# Patient Record
Sex: FEMALE | Race: WHITE | NOT HISPANIC OR LATINO | Employment: FULL TIME | URBAN - NONMETROPOLITAN AREA
[De-identification: names, ages, dates, MRNs, and addresses within clinical notes are randomized per-mention and may not be internally consistent; named-entity substitution may affect disease eponyms.]

---

## 2018-08-01 ENCOUNTER — OFFICE VISIT (OUTPATIENT)
Dept: SURGERY | Facility: CLINIC | Age: 28
End: 2018-08-01

## 2018-08-01 VITALS
SYSTOLIC BLOOD PRESSURE: 120 MMHG | WEIGHT: 158 LBS | BODY MASS INDEX: 31.02 KG/M2 | DIASTOLIC BLOOD PRESSURE: 80 MMHG | RESPIRATION RATE: 16 BRPM | HEART RATE: 60 BPM | HEIGHT: 60 IN

## 2018-08-01 DIAGNOSIS — R14.0 BLOATING: Primary | ICD-10-CM

## 2018-08-01 DIAGNOSIS — K59.09 OTHER CONSTIPATION: ICD-10-CM

## 2018-08-01 DIAGNOSIS — K21.9 GASTROESOPHAGEAL REFLUX DISEASE, ESOPHAGITIS PRESENCE NOT SPECIFIED: ICD-10-CM

## 2018-08-01 DIAGNOSIS — R14.1 ABDOMINAL GAS PAIN: ICD-10-CM

## 2018-08-01 PROCEDURE — 99204 OFFICE O/P NEW MOD 45 MIN: CPT | Performed by: SURGERY

## 2018-08-01 RX ORDER — MELATONIN
1000 DAILY
COMMUNITY

## 2018-08-01 RX ORDER — BISACODYL 5 MG/1
5 TABLET, DELAYED RELEASE ORAL DAILY PRN
COMMUNITY

## 2018-08-01 RX ORDER — IBUPROFEN 800 MG
1 TABLET ORAL DAILY
COMMUNITY

## 2018-08-01 RX ORDER — IBUPROFEN 200 MG
200 TABLET ORAL EVERY 6 HOURS PRN
COMMUNITY
End: 2018-08-28 | Stop reason: HOSPADM

## 2018-08-01 NOTE — H&P
"Ammy Mendosa 28 y.o. female presents @ the req of Dr. Hernandez for eval of constipation \" most of my life.\" Pt reports abd pain, bloating, constipation, and father =colon polyps.       HPI   Above noted and agree.  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  She has had issues with constipation her entire life.  She moves her bowels once a week.  She also has abdominal bloating and gas pain.  She has anxiety associated with this.  She has tried fiber, water, exercise, and many other medications without success.  She has never had an EGD or colonoscopy.  She denies chest pain and fevers or chills.  She has no other complaints.      Review of Systems   All other systems reviewed and are negative.          Past Medical History:   Diagnosis Date   • Anxiety    • GERD (gastroesophageal reflux disease)    • Seasonal affective disorder (CMS/HCC)            Past Surgical History:   Procedure Laterality Date   • WISDOM TOOTH EXTRACTION             Physical Exam   Constitutional: She is oriented to person, place, and time. She appears well-developed and well-nourished.   HENT:   Head: Normocephalic and atraumatic.   Neck: Neck supple.   Cardiovascular: Normal rate and regular rhythm.    Pulmonary/Chest: Effort normal and breath sounds normal.   Abdominal: Soft. Bowel sounds are normal.   Musculoskeletal: She exhibits no edema or deformity.   Neurological: She is alert and oriented to person, place, and time.   Skin: Skin is warm and dry.   Psychiatric: She has a normal mood and affect. Her behavior is normal.   Nursing note and vitals reviewed.          /80   Pulse 60   Resp 16   Ht 152.4 cm (60\")   Wt 71.7 kg (158 lb)   BMI 30.86 kg/m²          Ammy was seen today for constipation.    Diagnoses and all orders for this visit:    Bloating    Abdominal gas pain    Other constipation    Gastroesophageal reflux disease, esophagitis presence not specified    Other orders  -     " linaclotide (LINZESS) 290 MCG capsule capsule; Take 1 capsule by mouth Every Morning Before Breakfast.    We will schedule Ammy for an EGD, colonoscopy, and gallbladder ultrasound.    I discussed with the patient the benefits and risks of performing endoscopy.  Benefits and risks were not limited to but including bleeding, infection, perforation, complications of anesthesia, aspiration.  The patient appeared to understand and is willing to proceed.    Thank you for allowing me to participate in the care of this interesting patient.

## 2018-08-01 NOTE — PROGRESS NOTES
"Ammy Mendosa 28 y.o. female presents @ the req of Dr. Hernandez for eval of constipation \" most of my life.\" Pt reports abd pain, bloating, constipation, and father =colon polyps.       HPI   Above noted and agree.  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  She has had issues with constipation her entire life.  She moves her bowels once a week.  She also has abdominal bloating and gas pain.  She has anxiety associated with this.  She has tried fiber, water, exercise, and many other medications without success.  She has never had an EGD or colonoscopy.  She denies chest pain and fevers or chills.  She has no other complaints.      Review of Systems   All other systems reviewed and are negative.          Past Medical History:   Diagnosis Date   • Anxiety    • GERD (gastroesophageal reflux disease)    • Seasonal affective disorder (CMS/HCC)            Past Surgical History:   Procedure Laterality Date   • WISDOM TOOTH EXTRACTION             Physical Exam   Constitutional: She is oriented to person, place, and time. She appears well-developed and well-nourished.   HENT:   Head: Normocephalic and atraumatic.   Neck: Neck supple.   Cardiovascular: Normal rate and regular rhythm.    Pulmonary/Chest: Effort normal and breath sounds normal.   Abdominal: Soft. Bowel sounds are normal.   Musculoskeletal: She exhibits no edema or deformity.   Neurological: She is alert and oriented to person, place, and time.   Skin: Skin is warm and dry.   Psychiatric: She has a normal mood and affect. Her behavior is normal.   Nursing note and vitals reviewed.          /80   Pulse 60   Resp 16   Ht 152.4 cm (60\")   Wt 71.7 kg (158 lb)   BMI 30.86 kg/m²         Ammy was seen today for constipation.    Diagnoses and all orders for this visit:    Bloating    Abdominal gas pain    Other constipation    Gastroesophageal reflux disease, esophagitis presence not specified    Other orders  -     linaclotide " (LINZESS) 290 MCG capsule capsule; Take 1 capsule by mouth Every Morning Before Breakfast.    We will schedule Ammy for an EGD, colonoscopy, and gallbladder ultrasound.    I discussed with the patient the benefits and risks of performing endoscopy.  Benefits and risks were not limited to but including bleeding, infection, perforation, complications of anesthesia, aspiration.  The patient appeared to understand and is willing to proceed.    Thank you for allowing me to participate in the care of this interesting patient.

## 2018-08-02 DIAGNOSIS — R14.0 BLOATING: Primary | ICD-10-CM

## 2018-08-27 ENCOUNTER — ANESTHESIA EVENT (OUTPATIENT)
Dept: PERIOP | Facility: HOSPITAL | Age: 28
End: 2018-08-27

## 2018-08-28 ENCOUNTER — ANESTHESIA (OUTPATIENT)
Dept: PERIOP | Facility: HOSPITAL | Age: 28
End: 2018-08-28

## 2018-08-28 ENCOUNTER — HOSPITAL ENCOUNTER (OUTPATIENT)
Dept: ULTRASOUND IMAGING | Facility: HOSPITAL | Age: 28
Discharge: HOME | End: 2018-08-28
Attending: SURGERY

## 2018-08-28 ENCOUNTER — HOSPITAL ENCOUNTER (OUTPATIENT)
Facility: HOSPITAL | Age: 28
Setting detail: HOSPITAL OUTPATIENT SURGERY
Discharge: HOME | End: 2018-08-28
Attending: SURGERY | Admitting: SURGERY

## 2018-08-28 VITALS
WEIGHT: 153 LBS | SYSTOLIC BLOOD PRESSURE: 128 MMHG | HEIGHT: 60 IN | DIASTOLIC BLOOD PRESSURE: 74 MMHG | BODY MASS INDEX: 30.04 KG/M2 | RESPIRATION RATE: 15 BRPM | TEMPERATURE: 98.6 F | HEART RATE: 70 BPM | OXYGEN SATURATION: 96 %

## 2018-08-28 DIAGNOSIS — R14.0 BLOATING: ICD-10-CM

## 2018-08-28 DIAGNOSIS — R14.1 ABDOMINAL GAS PAIN: ICD-10-CM

## 2018-08-28 DIAGNOSIS — K59.09 OTHER CONSTIPATION: ICD-10-CM

## 2018-08-28 DIAGNOSIS — K21.9 GASTROESOPHAGEAL REFLUX DISEASE, ESOPHAGITIS PRESENCE NOT SPECIFIED: ICD-10-CM

## 2018-08-28 PROCEDURE — 25010000002 PROPOFOL 10 MG/ML EMULSION: Performed by: NURSE ANESTHETIST, CERTIFIED REGISTERED

## 2018-08-28 PROCEDURE — 45378 DIAGNOSTIC COLONOSCOPY: CPT | Performed by: SURGERY

## 2018-08-28 PROCEDURE — 43239 EGD BIOPSY SINGLE/MULTIPLE: CPT | Performed by: SURGERY

## 2018-08-28 PROCEDURE — 87081 CULTURE SCREEN ONLY: CPT | Performed by: SURGERY

## 2018-08-28 PROCEDURE — 76705 ECHO EXAM OF ABDOMEN: CPT

## 2018-08-28 RX ORDER — PROPOFOL 10 MG/ML
VIAL (ML) INTRAVENOUS AS NEEDED
Status: DISCONTINUED | OUTPATIENT
Start: 2018-08-28 | End: 2018-08-28 | Stop reason: SURG

## 2018-08-28 RX ORDER — SODIUM CHLORIDE 9 MG/ML
40 INJECTION, SOLUTION INTRAVENOUS AS NEEDED
Status: DISCONTINUED | OUTPATIENT
Start: 2018-08-28 | End: 2018-08-28 | Stop reason: HOSPADM

## 2018-08-28 RX ORDER — SODIUM CHLORIDE 0.9 % (FLUSH) 0.9 %
1-10 SYRINGE (ML) INJECTION AS NEEDED
Status: DISCONTINUED | OUTPATIENT
Start: 2018-08-28 | End: 2018-08-28 | Stop reason: HOSPADM

## 2018-08-28 RX ORDER — LIDOCAINE HYDROCHLORIDE 20 MG/ML
INJECTION, SOLUTION INFILTRATION; PERINEURAL AS NEEDED
Status: DISCONTINUED | OUTPATIENT
Start: 2018-08-28 | End: 2018-08-28 | Stop reason: SURG

## 2018-08-28 RX ORDER — ONDANSETRON 2 MG/ML
4 INJECTION INTRAMUSCULAR; INTRAVENOUS ONCE AS NEEDED
Status: CANCELLED | OUTPATIENT
Start: 2018-08-28

## 2018-08-28 RX ORDER — SODIUM CHLORIDE, SODIUM LACTATE, POTASSIUM CHLORIDE, CALCIUM CHLORIDE 600; 310; 30; 20 MG/100ML; MG/100ML; MG/100ML; MG/100ML
100 INJECTION, SOLUTION INTRAVENOUS CONTINUOUS
Status: CANCELLED | OUTPATIENT
Start: 2018-08-28

## 2018-08-28 RX ORDER — OMEPRAZOLE 40 MG/1
40 CAPSULE, DELAYED RELEASE ORAL DAILY
Qty: 30 CAPSULE | Refills: 6 | Status: SHIPPED | OUTPATIENT
Start: 2018-08-28

## 2018-08-28 RX ORDER — MEPERIDINE HYDROCHLORIDE 25 MG/ML
12.5 INJECTION INTRAMUSCULAR; INTRAVENOUS; SUBCUTANEOUS
Status: CANCELLED | OUTPATIENT
Start: 2018-08-28 | End: 2018-08-29

## 2018-08-28 RX ORDER — LIDOCAINE HYDROCHLORIDE 10 MG/ML
0.5 INJECTION, SOLUTION EPIDURAL; INFILTRATION; INTRACAUDAL; PERINEURAL ONCE AS NEEDED
Status: DISCONTINUED | OUTPATIENT
Start: 2018-08-28 | End: 2018-08-28 | Stop reason: HOSPADM

## 2018-08-28 RX ORDER — SODIUM CHLORIDE, SODIUM LACTATE, POTASSIUM CHLORIDE, CALCIUM CHLORIDE 600; 310; 30; 20 MG/100ML; MG/100ML; MG/100ML; MG/100ML
9 INJECTION, SOLUTION INTRAVENOUS CONTINUOUS
Status: DISCONTINUED | OUTPATIENT
Start: 2018-08-28 | End: 2018-08-28 | Stop reason: HOSPADM

## 2018-08-28 RX ADMIN — PROPOFOL 50 MG: 10 INJECTION, EMULSION INTRAVENOUS at 10:31

## 2018-08-28 RX ADMIN — SODIUM CHLORIDE, POTASSIUM CHLORIDE, SODIUM LACTATE AND CALCIUM CHLORIDE: 600; 310; 30; 20 INJECTION, SOLUTION INTRAVENOUS at 10:15

## 2018-08-28 RX ADMIN — PROPOFOL 20 MG: 10 INJECTION, EMULSION INTRAVENOUS at 10:29

## 2018-08-28 RX ADMIN — PROPOFOL 50 MG: 10 INJECTION, EMULSION INTRAVENOUS at 10:27

## 2018-08-28 RX ADMIN — PROPOFOL 50 MG: 10 INJECTION, EMULSION INTRAVENOUS at 10:34

## 2018-08-28 RX ADMIN — PROPOFOL 50 MG: 10 INJECTION, EMULSION INTRAVENOUS at 10:23

## 2018-08-28 RX ADMIN — PROPOFOL 50 MG: 10 INJECTION, EMULSION INTRAVENOUS at 10:37

## 2018-08-28 RX ADMIN — LIDOCAINE HYDROCHLORIDE 100 MG: 20 INJECTION, SOLUTION INFILTRATION; PERINEURAL at 10:19

## 2018-08-28 RX ADMIN — PROPOFOL 150 MG: 10 INJECTION, EMULSION INTRAVENOUS at 10:19

## 2018-08-28 NOTE — ANESTHESIA POSTPROCEDURE EVALUATION
Patient: Ammy Mendosa    Procedure Summary     Date:  08/28/18 Room / Location:  [facility] ENDOSCOPY 1 /  [facility] OR    Anesthesia Start:  1015 Anesthesia Stop:  1045    Procedures:       ESOPHAGOGASTRODUODENOSCOPY  (N/A Esophagus)      COLONOSCOPY (N/A ) Diagnosis:       Other constipation      Bloating      Abdominal gas pain      Gastroesophageal reflux disease, esophagitis presence not specified      (Other constipation [K59.09])      (Bloating [R14.0])      (Abdominal gas pain [R14.1])      (Gastroesophageal reflux disease, esophagitis presence not specified [K21.9])    Surgeon:  Allegra Knott DO Provider:  Rosetta Avina CRNA    Anesthesia Type:  MAC ASA Status:  1          Anesthesia Type: MAC  Last vitals  BP   128/74 (08/28/18 1110)   Temp   98.6 °F (37 °C) (08/28/18 0828)   Pulse   70 (08/28/18 1110)   Resp   15 (08/28/18 1110)     SpO2   96 % (08/28/18 1110)     Post Anesthesia Care and Evaluation    Patient location during evaluation: bedside  Patient participation: complete - patient participated  Level of consciousness: awake and alert  Pain score: 0  Pain management: adequate  Airway patency: patent  Anesthetic complications: No anesthetic complications  PONV Status: none  Cardiovascular status: acceptable  Respiratory status: acceptable  Hydration status: acceptable

## 2018-08-28 NOTE — ANESTHESIA PREPROCEDURE EVALUATION
Anesthesia Evaluation     no history of anesthetic complications:  NPO Solid Status: > 8 hours  NPO Liquid Status: > 8 hours           Airway   Mallampati: II  TM distance: >3 FB  Neck ROM: full  Dental - normal exam     Pulmonary - normal exam    breath sounds clear to auscultation  (+) asthma (as child, none now\),   Cardiovascular - negative cardio ROS and normal exam    Rhythm: regular  Rate: normal        Neuro/Psych  GI/Hepatic/Renal/Endo    (+)  GERD well controlled,      Musculoskeletal (-) negative ROS    Abdominal  - normal exam   Substance History - negative use     OB/GYN negative ob/gyn ROS         Other - negative ROS                       Anesthesia Plan    ASA 1     MAC     intravenous induction   Anesthetic plan and risks discussed with patient.

## 2018-08-29 LAB — UREASE TISS QL: NEGATIVE

## 2018-09-04 LAB
LAB AP CASE REPORT: NORMAL
LAB AP CLINICAL INFORMATION: NORMAL
PATH REPORT.FINAL DX SPEC: NORMAL

## 2018-09-06 DIAGNOSIS — R14.0 BLOATING: Primary | ICD-10-CM

## 2018-09-10 ENCOUNTER — TELEPHONE (OUTPATIENT)
Dept: SURGERY | Facility: CLINIC | Age: 28
End: 2018-09-10

## 2018-09-10 NOTE — TELEPHONE ENCOUNTER
"Pt called c/o nausea and req Zofran.  Spoke with Dr. Hedy amos the following orders rec'd:    Zofran 4 mg - 1 tab every 6 hr as needed for nausea - #30 - NO RF.  Rx called to [facility]     Pt is aware.   "

## 2018-09-12 ENCOUNTER — HOSPITAL ENCOUNTER (OUTPATIENT)
Dept: NUCLEAR MEDICINE | Facility: HOSPITAL | Age: 28
Discharge: HOME | End: 2018-09-12
Attending: SURGERY

## 2018-09-12 DIAGNOSIS — R14.0 BLOATING: ICD-10-CM

## 2018-09-12 PROCEDURE — A9537 TC99M MEBROFENIN: HCPCS | Performed by: SURGERY

## 2018-09-12 PROCEDURE — 78227 HEPATOBIL SYST IMAGE W/DRUG: CPT

## 2018-09-12 PROCEDURE — 0 TECHNETIUM TC 99M MEBROFENIN KIT: Performed by: SURGERY

## 2018-09-12 RX ORDER — KIT FOR THE PREPARATION OF TECHNETIUM TC 99M MEBROFENIN 45 MG/10ML
1 INJECTION, POWDER, LYOPHILIZED, FOR SOLUTION INTRAVENOUS
Status: COMPLETED | OUTPATIENT
Start: 2018-09-12 | End: 2018-09-12

## 2018-09-12 RX ADMIN — MEBROFENIN 1 DOSE: 45 INJECTION, POWDER, LYOPHILIZED, FOR SOLUTION INTRAVENOUS at 12:57

## 2018-09-14 ENCOUNTER — OFFICE VISIT (OUTPATIENT)
Dept: SURGERY | Facility: CLINIC | Age: 28
End: 2018-09-14

## 2018-09-14 DIAGNOSIS — K29.80 DUODENITIS: Primary | ICD-10-CM

## 2018-09-14 PROCEDURE — 99213 OFFICE O/P EST LOW 20 MIN: CPT | Performed by: SURGERY

## 2018-09-14 NOTE — PROGRESS NOTES
Ammy Mendosa 28 y.o. female presents for PO FU EGD/c-scope/GB US/Hida.  Pt reports she cont to have epi pain, RUQ pain, and nausea.  Pt reports she took 1 dose of Linzess and she feels like that caused diarrhea and increase in s/s.  PCP Dr. Hernandez.       HPI     Above noted and agree.  Ammy had Gastric polyps, duodenitis, normal colon.  She had a negative ultrasound and negative HIDA scan.  We had a long discussion on dietary changes.  She has noticed that bread causes constipation.  She drinks coffee.  The Linzess caused diarrhea and a lot of bloating and gas.  She was doing well until she started working nights.  She has had issues with constipation all of her life.  Her dad had many polyps removed and the doctor told him his family needed 5 year colonoscopies.  She has no fevers or chills.  She has no nausea or vomiting.    Review of Systems        Past Medical History:   Diagnosis Date   • Abdominal discomfort    • Anxiety    • Asthma     as a child   • Constipation    • GERD (gastroesophageal reflux disease)    • Seasonal affective disorder (CMS/HCC)            Past Surgical History:   Procedure Laterality Date   • COLONOSCOPY N/A 8/28/2018    Procedure: COLONOSCOPY;  Surgeon: Allegra Knott DO;  Location: [facility] OR;  Service: Gastroenterology   • ENDOSCOPY N/A 8/28/2018    Procedure: ESOPHAGOGASTRODUODENOSCOPY ;  Surgeon: Allegra Knott DO;  Location: [facility] OR;  Service: Gastroenterology   • WISDOM TOOTH EXTRACTION             Physical Exam   Constitutional: She is oriented to person, place, and time. She appears well-developed and well-nourished.   HENT:   Head: Normocephalic and atraumatic.   Cardiovascular: Normal rate and regular rhythm.    Pulmonary/Chest: Effort normal and breath sounds normal.   Abdominal: Soft. Bowel sounds are normal.   Musculoskeletal: She exhibits no edema or deformity.   Neurological: She is alert and oriented to person, place, and time.   Skin: Skin is warm and dry.    Psychiatric: She has a normal mood and affect. Her behavior is normal.   Nursing note reviewed.          There were no vitals taken for this visit.        Ammy was seen today for post-op follow-up.    Diagnoses and all orders for this visit:    Duodenitis    We had a long discussion regarding dietary changes.  We discussed other sources of constipation such as pasta and cheese.  She does not drink milk.  We discussed keeping a food journal so she could see what foods affect her and how they affect her.  We will add her to our 5 year repeat colonoscopy list due to her family history.    Thank you for allowing me to participate in the care of this interesting patient.

## 2018-09-24 ENCOUNTER — TELEPHONE (OUTPATIENT)
Dept: SURGERY | Facility: CLINIC | Age: 28
End: 2018-09-24

## 2018-09-24 DIAGNOSIS — R14.0 BLOATING: Primary | ICD-10-CM

## 2018-09-24 DIAGNOSIS — K59.00 CONSTIPATION, UNSPECIFIED CONSTIPATION TYPE: ICD-10-CM

## 2018-09-24 NOTE — TELEPHONE ENCOUNTER
09/21/2018 - Pt called and reports she is having orange oily stools X 1 week. Pt informed Dr. Knott is out of the office until 09/24/2018.    09/24/2018 - Phone call to pt and she reports she has not had BM since calling this office last week, therefore, has not seen any further orange stool.  Pt reports she has been keeping a food diary and thinks she has found some correlation between gluten and bloating/constipation.  Pt's med list rev'd and pt questioned about taking Metamucil as directed and Extra Strength gas pills.  Pt states she took the Metamucil 1 time and does use the gas pills.  Dr. Knott informed and the following ordered and discussed with pt:    1)  Start Metamucil as previously discussed.  2) Drink 10 glasses of water everyday.  3) Stop gas pills.  4) Celiac panel ordered.    Pt agreeable.

## 2018-09-25 ENCOUNTER — LAB (OUTPATIENT)
Dept: LAB | Facility: HOSPITAL | Age: 28
End: 2018-09-25
Attending: SURGERY

## 2018-09-25 DIAGNOSIS — R14.0 BLOATING: ICD-10-CM

## 2018-09-25 DIAGNOSIS — K59.00 CONSTIPATION, UNSPECIFIED CONSTIPATION TYPE: ICD-10-CM

## 2018-09-25 PROCEDURE — 83516 IMMUNOASSAY NONANTIBODY: CPT

## 2018-09-25 PROCEDURE — 86255 FLUORESCENT ANTIBODY SCREEN: CPT

## 2018-09-25 PROCEDURE — 36415 COLL VENOUS BLD VENIPUNCTURE: CPT

## 2018-09-25 PROCEDURE — 82784 ASSAY IGA/IGD/IGG/IGM EACH: CPT

## 2018-09-26 LAB
ENDOMYSIUM IGA SER QL: NEGATIVE
GLIADIN PEPTIDE IGA SER-ACNC: 3 UNITS (ref 0–19)
GLIADIN PEPTIDE IGG SER-ACNC: 2 UNITS (ref 0–19)
IGA SERPL-MCNC: 154 MG/DL (ref 87–352)
TTG IGA SER-ACNC: <2 U/ML (ref 0–3)
TTG IGG SER-ACNC: <2 U/ML (ref 0–5)

## 2023-09-05 ENCOUNTER — TELEPHONE (OUTPATIENT)
Dept: SURGERY | Facility: CLINIC | Age: 33
End: 2023-09-05
Payer: COMMERCIAL

## (undated) DEVICE — VIAL FORMALIN CAP 10P 40ML

## (undated) DEVICE — LAB CORP AGAR SLANT UREA PK/10

## (undated) DEVICE — SUCTION CANISTER, 3000CC,SAFELINER: Brand: DEROYAL

## (undated) DEVICE — SYR LL 3CC

## (undated) DEVICE — FRCP BX RADJAW4 NDL 2.8 240CM LG OG BX40

## (undated) DEVICE — Device: Brand: DEFENDO AIR/WATER/SUCTION AND BIOPSY VALVE

## (undated) DEVICE — BW-412T DISP COMBO CLEANING BRUSH: Brand: SINGLE USE COMBINATION CLEANING BRUSH

## (undated) DEVICE — THE BITE BLOCK MAXI, LATEX FREE STRAP IS USED TO PROTECT THE ENDOSCOPE INSERTION TUBE FROM BEING BITTEN BY THE PATIENT.

## (undated) DEVICE — SPNG GZ WOVN 4X4IN 12PLY 10/BX STRL

## (undated) DEVICE — GOWN ISOL W/THUMB UNIV BLU BX/15

## (undated) DEVICE — MASK,FACE,FLUID RESIST,SHLD,EARLOOP: Brand: MEDLINE

## (undated) DEVICE — ENDO. PORT CONNECTOR W/VALVE FOR OLYMPUS® SCOPES: Brand: ERBE

## (undated) DEVICE — Device